# Patient Record
Sex: MALE | Race: WHITE | NOT HISPANIC OR LATINO | Employment: FULL TIME | URBAN - METROPOLITAN AREA
[De-identification: names, ages, dates, MRNs, and addresses within clinical notes are randomized per-mention and may not be internally consistent; named-entity substitution may affect disease eponyms.]

---

## 2017-10-04 ENCOUNTER — OFFICE VISIT (OUTPATIENT)
Dept: INFECTIOUS DISEASES | Facility: CLINIC | Age: 52
End: 2017-10-04
Payer: COMMERCIAL

## 2017-10-04 VITALS
HEART RATE: 80 BPM | HEIGHT: 69 IN | BODY MASS INDEX: 28.63 KG/M2 | TEMPERATURE: 99 F | DIASTOLIC BLOOD PRESSURE: 108 MMHG | SYSTOLIC BLOOD PRESSURE: 159 MMHG | WEIGHT: 193.31 LBS

## 2017-10-04 DIAGNOSIS — Z00.00 ROUTINE HEALTH MAINTENANCE: ICD-10-CM

## 2017-10-04 DIAGNOSIS — Z71.84 TRAVEL ADVICE ENCOUNTER: Primary | ICD-10-CM

## 2017-10-04 PROCEDURE — 99999 PR PBB SHADOW E&M-NEW PATIENT-LVL III: CPT | Mod: PBBFAC,,, | Performed by: INTERNAL MEDICINE

## 2017-10-04 PROCEDURE — 90686 IIV4 VACC NO PRSV 0.5 ML IM: CPT | Mod: S$GLB,,, | Performed by: INTERNAL MEDICINE

## 2017-10-04 PROCEDURE — 90471 IMMUNIZATION ADMIN: CPT | Mod: S$GLB,,, | Performed by: INTERNAL MEDICINE

## 2017-10-04 PROCEDURE — 99204 OFFICE O/P NEW MOD 45 MIN: CPT | Mod: 25,S$GLB,, | Performed by: INTERNAL MEDICINE

## 2017-10-04 PROCEDURE — 90632 HEPA VACCINE ADULT IM: CPT | Mod: S$GLB,,, | Performed by: INTERNAL MEDICINE

## 2017-10-04 PROCEDURE — 90472 IMMUNIZATION ADMIN EACH ADD: CPT | Mod: S$GLB,,, | Performed by: INTERNAL MEDICINE

## 2017-10-04 RX ORDER — LEVOTHYROXINE SODIUM 50 UG/1
50 TABLET ORAL DAILY
COMMUNITY

## 2017-10-04 RX ORDER — AZELASTINE 1 MG/ML
1 SPRAY, METERED NASAL 2 TIMES DAILY
COMMUNITY

## 2017-10-04 RX ORDER — AZITHROMYCIN 500 MG/1
500 TABLET, FILM COATED ORAL DAILY
Qty: 3 TABLET | Refills: 0 | Status: SHIPPED | OUTPATIENT
Start: 2017-10-04 | End: 2017-10-07

## 2017-10-04 RX ORDER — ATOVAQUONE AND PROGUANIL HYDROCHLORIDE 250; 100 MG/1; MG/1
TABLET, FILM COATED ORAL
Qty: 15 TABLET | Refills: 0 | Status: SHIPPED | OUTPATIENT
Start: 2017-10-04

## 2017-10-04 NOTE — PROGRESS NOTES
Pt received his travel vaccinations (Influenza, Hepatitis A, Hepatitis B, and [place] Yellow Fever). Pt previously consented in clinic for the Yellow Fever vaccination. Pt already has vaccination handouts. Yellow travel card updated. Pt tolerated injections well. Pt observed for 15 minutes. No reaction. Pt left in NAD.

## 2017-10-04 NOTE — PROGRESS NOTES
Travel Consult  Chief Complaint   Patient presents with    Travel Consult     Traveling to [facility] to the [place] leaving on Oct 27th for     Bashir Ramos is here for travel consultation.  53 y/o M h/o hypothyroidism Works for [employer] will be going on 21 day voyage, leaves port 10[place]23 going to [facility] for 2 days then will be going back for 5 weeks at a time here for travel eval    [place]    Accommodations: hotel  Purpose of travel: field work  Currently ill / Fever: no  History of Splenectomy: no  The patient states that He does not live with a household member that has cancer, HIV infection, or take drugs to suppress the immune system.  No past medical history on file.  Review of patient's allergies indicates no known allergies.    There is no immunization history on file for this patient.     Review of Systems   Constitutional: Negative for chills and fever.   HENT: Negative for congestion.    Eyes: Negative for blurred vision.   Respiratory: Negative for cough, sputum production and shortness of breath.    Cardiovascular: Negative for chest pain and leg swelling.   Gastrointestinal: Negative for abdominal pain, diarrhea, nausea and vomiting.   Genitourinary: Negative for dysuria and urgency.   Musculoskeletal: Negative for neck pain.   Skin: Negative for rash.   Neurological: Negative for dizziness and headaches.   Endo/Heme/Allergies: Negative for polydipsia.   Psychiatric/Behavioral: Negative for depression.       There were no vitals filed for this visit.    Physical Exam   Constitutional: He is oriented to person, place, and time and well-developed, well-nourished, and in no distress. No distress.   HENT:   Head: Normocephalic and atraumatic.   Mouth/Throat: No oropharyngeal exudate.   Eyes: Conjunctivae are normal.   Neck: Neck supple.   Cardiovascular: Normal rate and regular rhythm.    No murmur heard.  Pulmonary/Chest: Effort normal and breath  sounds normal. No respiratory distress. He has no wheezes.   Abdominal: Soft. Bowel sounds are normal. He exhibits no distension. There is no tenderness. There is no rebound.   Musculoskeletal: He exhibits no edema or tenderness.   Neurological: He is alert and oriented to person, place, and time.   Skin: Skin is warm and dry.         ASSESSMENT: 51 y/o M h/o hypothyroidism Works for [employer] will be going on 21 day voyage, leaves [place] 10/23 going to [facility] for 2 days then will be going back for 5 weeks at a time here for travel eval  - tdap, menactra, hep A x1, hep B x 1, all completed  - give flu shot today, 2nd hep A, 2nd hep B, typhoid PO, yellow fever, azithro prn complicated diarrhea. Pt will be provided malaria ppx through his company    PLAN:  1. The Patient was provided with an extensive travel guidance packet which provides travel information specific to the patients itinerary.   2. The patient's medical history was reviewed and the patient was counseled on:  · Dietary precautions.  · Personal protective measures to prevent insect-borne diseases (e.g., malaria, dengue).  · Precautions to prevent exposure to rabies and seek treatment for possible exposures.  · Precautions against sun exposure.  · Precautions against development of DVT during flight.  · Personal and travel safety.  3. The patient's immunization history was reviewed and, based on the patient's itinerary, immunizations were ordered.    4. The patient was encouraged to contact us about any problems that may develop after immunization and possible side effects were reviewed.    5. The patient was instructed to purchase Imodium over the counter to take in case diarrhea (without blood or fever) develops.  An antibiotic was ordered for treatment if severe or bloody diarrhea develops and the patient was instructed on use and possible side effects.    6. The patient was also instructed to purchase insect repellent containing DEET or  Picardin and apply according to repellent label instructions.  If indicated by the patients itinerary an anti-malarial agent was prescribed for malaria prophylaxis and possible side effects were reviewed.    7. The patient was instructed to contact us if problems develop after travel.